# Patient Record
Sex: MALE | Race: WHITE
[De-identification: names, ages, dates, MRNs, and addresses within clinical notes are randomized per-mention and may not be internally consistent; named-entity substitution may affect disease eponyms.]

---

## 2019-01-04 ENCOUNTER — HOSPITAL ENCOUNTER (OUTPATIENT)
Dept: HOSPITAL 92 - SCSMRI | Age: 34
Discharge: HOME | End: 2019-01-04
Attending: NEUROLOGICAL SURGERY
Payer: COMMERCIAL

## 2019-01-04 DIAGNOSIS — M43.16: ICD-10-CM

## 2019-01-04 DIAGNOSIS — M48.061: ICD-10-CM

## 2019-01-04 DIAGNOSIS — M47.816: ICD-10-CM

## 2019-01-04 DIAGNOSIS — M51.36: Primary | ICD-10-CM

## 2019-01-04 DIAGNOSIS — M51.26: ICD-10-CM

## 2019-01-04 PROCEDURE — 72100 X-RAY EXAM L-S SPINE 2/3 VWS: CPT

## 2019-01-04 PROCEDURE — 72148 MRI LUMBAR SPINE W/O DYE: CPT

## 2019-01-04 NOTE — MRI
MRI LUMBAR SPINE WITHOUT CONTRAST

1/4/19

 

HISTORY: 

M61.36 - degenerative lumbar disc disease. Pain.

 

COMPARISON:  

Lumbar spine radiographs same day.

 

FINDINGS:  

The aortic contour is nonaneurysmal. No retroperitoneal adenopathy. Large  T2 hyperintense foci are p
resent involving the both kidneys suggesting cysts. 

 

The paraspinal musculature is symmetric. No marrow infiltrative process. 

 

The conus medullaris terminates at the inferior end plate of L1. Levels are as follows:

 

L1-2: Normal disc. No neural foraminal or spinal canal narrowing. 

 

L2-3: Normal disc. No neural foraminal or spinal canal narrowing. 

 

L3-4: There is low grade broad based posterior disc bulge with small bilateral subforaminal and poste
rior disc osteophyte complexes. These disc osteophyte complexes cause moderate bilateral neural lea
inal narrowing. There is abutment of both exiting nerve roots.

 

There is cyst formation of the pars interarticularis bilaterally with bilateral pars interarticularis
 defects. 

 

L4-5: Disc desiccation and circumferential disc bulge. There is 2 mm anterolisthesis. Bilateral pars 
interarticularis defects. There is abutment of the exiting right nerve root. 

 

L5-S1: Normal disc. No neural foramina or spinal canal narrowing. 

 

IMPRESSION:  

1.      Bilateral pars interarticularis defects at L3 and L4. 

2.      Partial resorption of the disc protrusion at L3-4 with bilateral subforaminal disc osteophyte
 complexes causing neural foraminal narrowing and nerve root abutment. 

3.      Low grade anterolisthesis of L4 over L5 with subsequent degenerative narrowing of the neural 
foramen. 

 

POS: [facility]

## 2019-01-04 NOTE — RAD
LUMBAR SPINE THREE VIEW:

1/4/19

 

HISTORY: 

N61.36 - degenerative lumbar disc disease.

 

COMPARISON:  

MRI 10/13/17.

 

FINDINGS:  

Mild narrowing of the L4-5 and L5-S1 disc spaces. There is grade I L3 over L4 and L4 over L5 anteroli
sthesis in the neutral position. No significant change with flexion or extension. 

 

IMPRESSION:  

No significant translation with flexion or extension. Bilateral pars interarticularis defect at L3 an
d possible also at L4.  

 

POS: [facility]